# Patient Record
Sex: FEMALE | Race: OTHER | URBAN - METROPOLITAN AREA
[De-identification: names, ages, dates, MRNs, and addresses within clinical notes are randomized per-mention and may not be internally consistent; named-entity substitution may affect disease eponyms.]

---

## 2024-02-19 ENCOUNTER — APPOINTMENT (RX ONLY)
Dept: URBAN - METROPOLITAN AREA CLINIC 81 | Facility: CLINIC | Age: 24
Setting detail: DERMATOLOGY
End: 2024-02-19

## 2024-02-19 DIAGNOSIS — L71.8 OTHER ROSACEA: ICD-10-CM | Status: WELL CONTROLLED

## 2024-02-19 PROCEDURE — 99213 OFFICE O/P EST LOW 20 MIN: CPT

## 2024-02-19 PROCEDURE — ? COUNSELING

## 2024-02-19 PROCEDURE — ? PRESCRIPTION MEDICATION MANAGEMENT

## 2024-02-19 PROCEDURE — ? PRESCRIPTION

## 2024-02-19 RX ORDER — METRONIDAZOLE 7.5 MG/G
GEL TOPICAL TWICE A DAY
Qty: 45 | Refills: 11 | Status: ERX | COMMUNITY
Start: 2024-02-19

## 2024-02-19 RX ADMIN — METRONIDAZOLE: 7.5 GEL TOPICAL at 00:00

## 2024-02-19 ASSESSMENT — LOCATION DETAILED DESCRIPTION DERM
LOCATION DETAILED: LEFT CENTRAL MALAR CHEEK
LOCATION DETAILED: RIGHT INFERIOR CENTRAL MALAR CHEEK
LOCATION DETAILED: LEFT INFERIOR MEDIAL FOREHEAD

## 2024-02-19 ASSESSMENT — LOCATION ZONE DERM: LOCATION ZONE: FACE

## 2024-02-19 ASSESSMENT — LOCATION SIMPLE DESCRIPTION DERM
LOCATION SIMPLE: LEFT CHEEK
LOCATION SIMPLE: RIGHT CHEEK
LOCATION SIMPLE: LEFT FOREHEAD

## 2024-02-19 NOTE — PROCEDURE: PRESCRIPTION MEDICATION MANAGEMENT
Plan: Patient will call when refills are needed.
Detail Level: Zone
Render In Strict Bullet Format?: No
Continue Regimen: Metronidazole 0.75% gel apply to face twice daily